# Patient Record
Sex: MALE
[De-identification: names, ages, dates, MRNs, and addresses within clinical notes are randomized per-mention and may not be internally consistent; named-entity substitution may affect disease eponyms.]

---

## 2023-03-10 ENCOUNTER — HOSPITAL ENCOUNTER (INPATIENT)
Dept: HOSPITAL 95 - ICUE | Age: 88
LOS: 4 days | DRG: 871 | End: 2023-03-14
Attending: STUDENT IN AN ORGANIZED HEALTH CARE EDUCATION/TRAINING PROGRAM | Admitting: STUDENT IN AN ORGANIZED HEALTH CARE EDUCATION/TRAINING PROGRAM
Payer: MEDICARE

## 2023-03-10 VITALS — HEIGHT: 70 IN | WEIGHT: 205.25 LBS | BODY MASS INDEX: 29.38 KG/M2

## 2023-03-10 DIAGNOSIS — E87.20: ICD-10-CM

## 2023-03-10 DIAGNOSIS — Z79.82: ICD-10-CM

## 2023-03-10 DIAGNOSIS — M54.9: ICD-10-CM

## 2023-03-10 DIAGNOSIS — N17.0: ICD-10-CM

## 2023-03-10 DIAGNOSIS — Z66: ICD-10-CM

## 2023-03-10 DIAGNOSIS — E86.1: ICD-10-CM

## 2023-03-10 DIAGNOSIS — I44.2: ICD-10-CM

## 2023-03-10 DIAGNOSIS — B95.2: ICD-10-CM

## 2023-03-10 DIAGNOSIS — E87.1: ICD-10-CM

## 2023-03-10 DIAGNOSIS — B96.20: ICD-10-CM

## 2023-03-10 DIAGNOSIS — Z79.899: ICD-10-CM

## 2023-03-10 DIAGNOSIS — N39.0: ICD-10-CM

## 2023-03-10 DIAGNOSIS — E11.22: ICD-10-CM

## 2023-03-10 DIAGNOSIS — E03.9: ICD-10-CM

## 2023-03-10 DIAGNOSIS — I35.0: ICD-10-CM

## 2023-03-10 DIAGNOSIS — Z79.4: ICD-10-CM

## 2023-03-10 DIAGNOSIS — N18.30: ICD-10-CM

## 2023-03-10 DIAGNOSIS — Z88.1: ICD-10-CM

## 2023-03-10 DIAGNOSIS — Z51.5: ICD-10-CM

## 2023-03-10 DIAGNOSIS — R65.21: ICD-10-CM

## 2023-03-10 DIAGNOSIS — Z79.84: ICD-10-CM

## 2023-03-10 DIAGNOSIS — E88.09: ICD-10-CM

## 2023-03-10 DIAGNOSIS — A04.72: ICD-10-CM

## 2023-03-10 DIAGNOSIS — Z88.8: ICD-10-CM

## 2023-03-10 DIAGNOSIS — D63.1: ICD-10-CM

## 2023-03-10 DIAGNOSIS — I44.0: ICD-10-CM

## 2023-03-10 DIAGNOSIS — I48.92: ICD-10-CM

## 2023-03-10 DIAGNOSIS — R31.9: ICD-10-CM

## 2023-03-10 DIAGNOSIS — E78.5: ICD-10-CM

## 2023-03-10 DIAGNOSIS — G70.00: ICD-10-CM

## 2023-03-10 DIAGNOSIS — Z79.891: ICD-10-CM

## 2023-03-10 DIAGNOSIS — I48.0: ICD-10-CM

## 2023-03-10 DIAGNOSIS — A41.50: Primary | ICD-10-CM

## 2023-03-10 DIAGNOSIS — E86.9: ICD-10-CM

## 2023-03-10 DIAGNOSIS — Z79.2: ICD-10-CM

## 2023-03-10 DIAGNOSIS — K21.9: ICD-10-CM

## 2023-03-10 DIAGNOSIS — G93.41: ICD-10-CM

## 2023-03-10 DIAGNOSIS — I12.9: ICD-10-CM

## 2023-03-10 DIAGNOSIS — K81.0: ICD-10-CM

## 2023-03-10 DIAGNOSIS — Z79.811: ICD-10-CM

## 2023-03-10 DIAGNOSIS — N40.0: ICD-10-CM

## 2023-03-10 DIAGNOSIS — K59.00: ICD-10-CM

## 2023-03-10 PROCEDURE — C9113 INJ PANTOPRAZOLE SODIUM, VIA: HCPCS

## 2023-03-10 PROCEDURE — A9270 NON-COVERED ITEM OR SERVICE: HCPCS

## 2023-03-10 PROCEDURE — G0378 HOSPITAL OBSERVATION PER HR: HCPCS

## 2023-03-10 PROCEDURE — C1751 CATH, INF, PER/CENT/MIDLINE: HCPCS

## 2023-03-11 LAB
ALBUMIN SERPL BCP-MCNC: 2.1 G/DL (ref 3.4–5)
ALBUMIN SERPL BCP-MCNC: 3.2 G/DL (ref 3.4–5)
ALBUMIN SERPL BCP-MCNC: 3.4 G/DL (ref 3.4–5)
ALBUMIN/GLOB SERPL: 1 {RATIO} (ref 0.8–1.8)
ALBUMIN/GLOB SERPL: 1.1 {RATIO} (ref 0.8–1.8)
ALBUMIN/GLOB SERPL: 1.2 {RATIO} (ref 0.8–1.8)
ALT SERPL W P-5'-P-CCNC: 11 U/L (ref 12–78)
ALT SERPL W P-5'-P-CCNC: 14 U/L (ref 12–78)
ALT SERPL W P-5'-P-CCNC: 46 U/L (ref 12–78)
ANION GAP SERPL CALCULATED.4IONS-SCNC: 3 MMOL/L (ref 6–16)
ANION GAP SERPL CALCULATED.4IONS-SCNC: 4 MMOL/L (ref 6–16)
ANION GAP SERPL CALCULATED.4IONS-SCNC: 6 MMOL/L (ref 6–16)
ANION GAP SERPL CALCULATED.4IONS-SCNC: 8 MMOL/L (ref 6–16)
AST SERPL W P-5'-P-CCNC: 13 U/L (ref 12–37)
AST SERPL W P-5'-P-CCNC: 14 U/L (ref 12–37)
AST SERPL W P-5'-P-CCNC: 72 U/L (ref 12–37)
BASOPHILS # BLD AUTO: 0.01 K/MM3 (ref 0–0.23)
BASOPHILS # BLD AUTO: 0.01 K/MM3 (ref 0–0.23)
BASOPHILS # BLD: 0 K/MM3 (ref 0–0.23)
BASOPHILS NFR BLD AUTO: 0 % (ref 0–2)
BASOPHILS NFR BLD AUTO: 0 % (ref 0–2)
BASOPHILS NFR BLD: 0 % (ref 0–2)
BILIRUB SERPL-MCNC: 0.7 MG/DL (ref 0.1–1)
BILIRUB SERPL-MCNC: 0.7 MG/DL (ref 0.1–1)
BILIRUB SERPL-MCNC: 1.4 MG/DL (ref 0.1–1)
BUN SERPL-MCNC: 25 MG/DL (ref 8–24)
BUN SERPL-MCNC: 28 MG/DL (ref 8–24)
BUN SERPL-MCNC: 28 MG/DL (ref 8–24)
BUN SERPL-MCNC: 30 MG/DL (ref 8–24)
CALCIUM SERPL-MCNC: 7.3 MG/DL (ref 8.5–10.1)
CALCIUM SERPL-MCNC: 8.1 MG/DL (ref 8.5–10.1)
CALCIUM SERPL-MCNC: 8.7 MG/DL (ref 8.5–10.1)
CALCIUM SERPL-MCNC: 8.7 MG/DL (ref 8.5–10.1)
CHLORIDE SERPL-SCNC: 103 MMOL/L (ref 98–108)
CHLORIDE SERPL-SCNC: 106 MMOL/L (ref 98–108)
CHLORIDE SERPL-SCNC: 109 MMOL/L (ref 98–108)
CHLORIDE SERPL-SCNC: 99 MMOL/L (ref 98–108)
CO2 SERPL-SCNC: 19 MMOL/L (ref 21–32)
CO2 SERPL-SCNC: 24 MMOL/L (ref 21–32)
CO2 SERPL-SCNC: 29 MMOL/L (ref 21–32)
CO2 SERPL-SCNC: 29 MMOL/L (ref 21–32)
CREAT SERPL-MCNC: 1.47 MG/DL (ref 0.6–1.2)
CREAT SERPL-MCNC: 1.49 MG/DL (ref 0.6–1.2)
CREAT SERPL-MCNC: 1.52 MG/DL (ref 0.6–1.2)
CREAT SERPL-MCNC: 2.05 MG/DL (ref 0.6–1.2)
DEPRECATED RDW RBC AUTO: 41.1 FL (ref 35.1–46.3)
DEPRECATED RDW RBC AUTO: 41.2 FL (ref 35.1–46.3)
DEPRECATED RDW RBC AUTO: 44.5 FL (ref 35.1–46.3)
EOSINOPHIL # BLD AUTO: 0.03 K/MM3 (ref 0–0.68)
EOSINOPHIL # BLD AUTO: 0.07 K/MM3 (ref 0–0.68)
EOSINOPHIL # BLD: 0 K/MM3 (ref 0–0.68)
EOSINOPHIL NFR BLD AUTO: 0 % (ref 0–6)
EOSINOPHIL NFR BLD AUTO: 1 % (ref 0–6)
EOSINOPHIL NFR BLD: 0 % (ref 0–6)
ERYTHROCYTE [DISTWIDTH] IN BLOOD BY AUTOMATED COUNT: 11.9 % (ref 11.7–14.2)
ERYTHROCYTE [DISTWIDTH] IN BLOOD BY AUTOMATED COUNT: 11.9 % (ref 11.7–14.2)
ERYTHROCYTE [DISTWIDTH] IN BLOOD BY AUTOMATED COUNT: 12.3 % (ref 11.7–14.2)
GLOBULIN SER CALC-MCNC: 2.2 G/DL (ref 2.2–4)
GLOBULIN SER CALC-MCNC: 2.8 G/DL (ref 2.2–4)
GLOBULIN SER CALC-MCNC: 2.9 G/DL (ref 2.2–4)
GLUCOSE SERPL-MCNC: 110 MG/DL (ref 70–99)
GLUCOSE SERPL-MCNC: 130 MG/DL (ref 70–99)
GLUCOSE SERPL-MCNC: 130 MG/DL (ref 70–99)
GLUCOSE SERPL-MCNC: 180 MG/DL (ref 70–99)
HCT VFR BLD AUTO: 29.9 % (ref 37–53)
HCT VFR BLD AUTO: 30.2 % (ref 37–53)
HCT VFR BLD AUTO: 31.4 % (ref 37–53)
HGB BLD-MCNC: 10.2 G/DL (ref 13.5–17.5)
HGB BLD-MCNC: 10.3 G/DL (ref 13.5–17.5)
HGB BLD-MCNC: 10.3 G/DL (ref 13.5–17.5)
IMM GRANULOCYTES # BLD AUTO: 0.02 K/MM3 (ref 0–0.1)
IMM GRANULOCYTES # BLD AUTO: 0.03 K/MM3 (ref 0–0.1)
IMM GRANULOCYTES NFR BLD AUTO: 0 % (ref 0–1)
IMM GRANULOCYTES NFR BLD AUTO: 0 % (ref 0–1)
LEUKOCYTE ESTERASE UR QL STRIP: (no result)
LYMPHOCYTES # BLD AUTO: 0.95 K/MM3 (ref 0.84–5.2)
LYMPHOCYTES # BLD AUTO: 1.11 K/MM3 (ref 0.84–5.2)
LYMPHOCYTES # BLD: 0.08 K/MM3 (ref 0.84–5.2)
LYMPHOCYTES NFR BLD AUTO: 10 % (ref 21–46)
LYMPHOCYTES NFR BLD AUTO: 12 % (ref 21–46)
LYMPHOCYTES NFR BLD: 1 % (ref 21–46)
MAGNESIUM SERPL-MCNC: 2.1 MG/DL (ref 1.6–2.4)
MAGNESIUM SERPL-MCNC: 2.6 MG/DL (ref 1.6–2.4)
MCHC RBC AUTO-ENTMCNC: 32.8 G/DL (ref 31.5–36.5)
MCHC RBC AUTO-ENTMCNC: 34.1 G/DL (ref 31.5–36.5)
MCHC RBC AUTO-ENTMCNC: 34.1 G/DL (ref 31.5–36.5)
MCV RBC AUTO: 94 FL (ref 80–100)
MCV RBC AUTO: 94 FL (ref 80–100)
MCV RBC AUTO: 98 FL (ref 80–100)
METAMYELOCYTES # BLD MANUAL: 0.56 K/MM3 (ref 0–0)
METAMYELOCYTES NFR BLD MANUAL: 7 % (ref 0–0)
MONOCYTES # BLD AUTO: 0.85 K/MM3 (ref 0.16–1.47)
MONOCYTES # BLD AUTO: 0.86 K/MM3 (ref 0.16–1.47)
MONOCYTES # BLD: 0.08 K/MM3 (ref 0.16–1.47)
MONOCYTES NFR BLD AUTO: 9 % (ref 4–13)
MONOCYTES NFR BLD AUTO: 9 % (ref 4–13)
MONOCYTES NFR BLD: 1 % (ref 4–13)
NEUTROPHILS # BLD AUTO: 7.53 K/MM3 (ref 1.96–9.15)
NEUTROPHILS # BLD AUTO: 7.82 K/MM3 (ref 1.96–9.15)
NEUTROPHILS NFR BLD AUTO: 78 % (ref 41–73)
NEUTROPHILS NFR BLD AUTO: 81 % (ref 41–73)
NEUTS BAND NFR BLD MANUAL: 27 % (ref 0–8)
NEUTS SEG # BLD MANUAL: 7.28 K/MM3 (ref 1.96–9.15)
NEUTS SEG NFR BLD MANUAL: 64 % (ref 41–73)
NRBC # BLD AUTO: 0 K/MM3 (ref 0–0.02)
NRBC BLD AUTO-RTO: 0 /100 WBC (ref 0–0.2)
PH BLDV: 7.26 [PH] (ref 7.34–7.37)
PHOSPHATE SERPL-MCNC: 2.6 MG/DL (ref 2.5–4.9)
PLATELET # BLD AUTO: 123 K/MM3 (ref 150–400)
PLATELET # BLD AUTO: 184 K/MM3 (ref 150–400)
PLATELET # BLD AUTO: 186 K/MM3 (ref 150–400)
POTASSIUM SERPL-SCNC: 3.4 MMOL/L (ref 3.5–5.5)
POTASSIUM SERPL-SCNC: 4.1 MMOL/L (ref 3.5–5.5)
POTASSIUM SERPL-SCNC: 4.5 MMOL/L (ref 3.5–5.5)
POTASSIUM SERPL-SCNC: 4.7 MMOL/L (ref 3.5–5.5)
PROT SERPL-MCNC: 4.3 G/DL (ref 6.4–8.2)
PROT SERPL-MCNC: 6 G/DL (ref 6.4–8.2)
PROT SERPL-MCNC: 6.3 G/DL (ref 6.4–8.2)
PROT UR STRIP-MCNC: (no result) MG/DL
PROTHROMBIN TIME: 10.5 SEC (ref 9.7–11.5)
RBC #/AREA URNS HPF: (no result) /HPF (ref 0–2)
SODIUM SERPL-SCNC: 132 MMOL/L (ref 136–145)
SODIUM SERPL-SCNC: 135 MMOL/L (ref 136–145)
SODIUM SERPL-SCNC: 136 MMOL/L (ref 136–145)
SODIUM SERPL-SCNC: 136 MMOL/L (ref 136–145)
SP GR SPEC: 1.01 (ref 1–1.02)
TOTAL CELLS COUNTED BLD: 100
URN SPEC COLLECT METH UR: (no result)
UROBILINOGEN UR STRIP-MCNC: (no result) MG/DL

## 2023-03-11 PROCEDURE — 02H633Z INSERTION OF INFUSION DEVICE INTO RIGHT ATRIUM, PERCUTANEOUS APPROACH: ICD-10-PCS

## 2023-03-11 PROCEDURE — 4A033R1 MEASUREMENT OF ARTERIAL SATURATION, PERIPHERAL, PERCUTANEOUS APPROACH: ICD-10-PCS | Performed by: STUDENT IN AN ORGANIZED HEALTH CARE EDUCATION/TRAINING PROGRAM

## 2023-03-11 PROCEDURE — 0T9B70Z DRAINAGE OF BLADDER WITH DRAINAGE DEVICE, VIA NATURAL OR ARTIFICIAL OPENING: ICD-10-PCS | Performed by: STUDENT IN AN ORGANIZED HEALTH CARE EDUCATION/TRAINING PROGRAM

## 2023-03-11 PROCEDURE — 3E03329 INTRODUCTION OF OTHER ANTI-INFECTIVE INTO PERIPHERAL VEIN, PERCUTANEOUS APPROACH: ICD-10-PCS | Performed by: STUDENT IN AN ORGANIZED HEALTH CARE EDUCATION/TRAINING PROGRAM

## 2023-03-11 PROCEDURE — 3E033XZ INTRODUCTION OF VASOPRESSOR INTO PERIPHERAL VEIN, PERCUTANEOUS APPROACH: ICD-10-PCS | Performed by: STUDENT IN AN ORGANIZED HEALTH CARE EDUCATION/TRAINING PROGRAM

## 2023-03-11 NOTE — NUR
DR. BREAUX IN TO SEE PT. FULL UPDATE GIVEN. PT MEDS RECONCILED WITH HIS WIFE.
DR. BREAUX TO UPDATE. 12 LEAD ECG DONE. ECG SHOWN AFIB WITH RATE 'S.
 AWARE.

## 2023-03-11 NOTE — NUR
LEVOPHED DRIP TITRATED UP TO 7 MCG/MIN TO KEEP MAP 60-65. LACTIC ACID 6.5-DR. BREAUX UPDATED. PROCALCITONIN STILL PENDING. REPORT GIVEN TO MARISOL AYON. RN TO
CONTACT NOC SHIFT HOSPITALIST IF PROCALCITONIN ELEVATED.

## 2023-03-11 NOTE — NUR
PT CONFUSED AND DISORIENTED. PT MOANING, BUT UNABLE TO REPORT DISCOMFORT AT
THIS TIME. ECG SHOWS SR TO SB WITH 1ST DEGREE AV BLOCK AND PVC'S. MAP TRENDING
47. LUNGS DIMINISHED T/O WITH FEW SCATTERED FINE CRACKLES TO MID/UPPER LOBES.
NO NOTED SOB. SATS 85% ON RA. PT PLACED ON 2 LITERS NASAL CANULA TO KEEP SATS
>90%. DR. BREAUX UPDATED. SATS CHEST XRAY DONE.

## 2023-03-11 NOTE — NUR
MAP TRENDING LESS THAN 50. HR 70-80'S-SR WITH 1ST DEGREE AV BLOCK AND
PAC/PVC'S. PT NORMALLY TAKES LISINOPRIL 2.5 MG PO PER HIS WIFE AND HE WAS
PRESCRIBED [address] MG [address] MIRANDA. DR. BREAUX AWARE. NS 1 LITER BOLUS TO BE
GIVEN. ECHO ORDERED.

## 2023-03-11 NOTE — NUR
PT REMAINS HYPOTENSIVE DESPITE FLUID BOLUSES. PICC LINE PLACEMENT
CONFIRMED-LEVOPHED DRIP INITIATED @ 2 MCG/MIN.

## 2023-03-11 NOTE — NUR
SHIFT SUMMARY
 
PATIENT REMAINS ALERT AND ORIENTED X4. 02 SATS 96% ON RA, DENIES SOB. HR SB
FIRST DEGREE HB, 45-50s, DROPPED QRS COMPLEX AT TIMES AND HR IN THE 30s. DOES
NOT SUSTAIN AND WHEN AWAKE HR IN THE 60s. BP STABLE, DENIES CP/PRESSURE.
PATIENT INDEPENDENT WITH REPOSITIONING. ABLE TO URINATE 450 MLS AFTER ALEXIS
WAS DISCONTINUED. MEDICATED WITH HOME PAIN MEDICATION PER EMAR FOR BACK PAIN.
CALL LIGHT IN REACH

## 2023-03-11 NOTE — NUR
ECHO COMPLETE. PT REPORTS 8/10 LOW BACK PAIN. MED WITH NORCO-SEE EMAR. PT
INCONTINENT OF SMALL, SMEAR OF BROWN STOOL. MARLENE CARE DONE, NEW ATTENDS, NEW
CARDONA, AND TURNED PT TO RIGHT SIDE. PT REPORTS POOR APPETITE AND STATES HE
DOES NOT FEEL LIKE EATING AT THIS TIME. PT DENIES NAUSEA. NS  INFUSING @ 150
CC/HR. PT CONTINUES TO DENY CP OR SOB. ECG SHOWS FREQUENT VENTIRICULAR ECTOPY
AND 4-5 BEAT RUN OF POLYMORPHIC VTACH. PT ASYMPTOMATIC. DR. BREAUX UPDATED AND
LABS ORDERED.,

## 2023-03-11 NOTE — NUR
PT A&OX4. [place] @ BASELINE. PT REPORTS THAT HIS MYSTHENIA GRAVIS LEAVES HIM
FEELING VERY WEAK AT TIMES. PT STATES THAT HE FEELS WEAK THIS AM.  PT HAS
RESIDUAL FOOT DROP FROM THE MYSTHENIA GRAVIS R>L. PT STATES THAT HE IS USUALLY
ABLE TO DRIVE AND DO HIS OWN ADL'S AT HOME. PT DENIES PAIN, CHEST DISCOMFORT,
AND SOB. ECG SHOWS SB TO SR WITH 1ST DEGREE AV BLOCK AND OCCASIONAL BLOCKED
PAC'S. 'S. NO NOTED EDEMA. DP/PT PULSES PALPABLE. LUNGS CLEAR AND
SLIGHTLY DIMINISHED IN THE BASES. SATS>90% ON RA. PT DENIES GI DISTRESS. HE
HAS A HX OF TYPE 2 DIABETES FOR WHICH HE TAKE METFORMIN @ HOME. PT CHECKS HIS
BLOOD SUGAR MONTHLY. ADA DIET TO BE INITIATED PER DR. BREAUX. PT REPORTS MILD
DYSURIA AND HEMATURIA NOTED. HEPARIN HELD DUE TO HEMATURIA. PT BOOSTED IN BED
FOR BREAKFAST. CALL LIGHT WITHIN REACH. PT MAKING HIS NEEDS KNOWN.

## 2023-03-11 NOTE — NUR
ASSUMED CARE AT 1900
 
PATIENT IS LETHARGIC AND ORIENTED TO SELF/FOLLOWING COMMANDS. CONFUSED AND
CALLING OUT FOR HIS WIFE. 02 SATS 97% ON 2L VIA NC. LS CLEAR TO DIMINISHED. HR
SB WITH PVCs FHB 50s, BP HYPOTENSIVE, CALLED DR AND PATIENT NOW ON LEVO, VASO
AND EPI TO MAINTAIN MAP >65. DENIES CP/PRESSURE. ALEXIS WITH BLOOD IN URINE AND
FROM URETHRA. UNSURE IF PATIENT PULLED ON CATHETER. PATIENT TO CT FOR CT OF
ABDOMEN. ANTIBIOTICS STARTED.

## 2023-03-11 NOTE — NUR
DR. BREAUX HERE TO SEE PT. FULL UPDATE GIVEN. ORDERS WRITTEN FOR LABS,BC X 2,
ADDITIONAL IV BOLUS, LEVOPHED, ALEXIS CATHETER PLACEMENT-UA, GI PANEL, PT MADE
NPO.

## 2023-03-11 NOTE — NUR
PATIENT ARRIVED AS A DIRECT ADMIT FROM [place] AT 2325. PATIENT IS ALERT AND
ORIENTED X4. 02 SATS 99% ON RA, DENIES SOB. HR SB-SR 55-65, PATIENT NOT PACED
DURING TRANSPORT. LONG [place] INTERVAL ON MONITOR. PATIENT DENIES CP/PRESSURE. BP
STABLE. ALEXIS THAT WAS PRESENT ON ADMIT WITH RED URINE, DC'd AND NOT REPLACED
AT THIS TIME, WILL MONITOR FOR RESTENTION. PATIENT INDEPENDENT WITH
REPOSITIONING IN BED, USES WALKER AT HOME. DR. MIRANDA TO ROOM AFTER PATIENT
ARRIVED.

## 2023-03-11 NOTE — NUR
PT HAVING FREQUENT INCONTINENCE OF BROWN, SOFT STOOL. HE REPORTS FEELING
EXTREMEMLY WEAK. MAP TRENDING 54-NS BOLUS STILL INFUSING.

## 2023-03-12 LAB
ALBUMIN SERPL BCP-MCNC: 2.5 G/DL (ref 3.4–5)
ALBUMIN/GLOB SERPL: 0.9 {RATIO} (ref 0.8–1.8)
ALT SERPL W P-5'-P-CCNC: 55 U/L (ref 12–78)
ANION GAP SERPL CALCULATED.4IONS-SCNC: 13 MMOL/L (ref 6–16)
AST SERPL W P-5'-P-CCNC: 72 U/L (ref 12–37)
BASOPHILS # BLD: 0 K/MM3 (ref 0–0.23)
BASOPHILS NFR BLD: 0 % (ref 0–2)
BILIRUB SERPL-MCNC: 1.2 MG/DL (ref 0.1–1)
BUN SERPL-MCNC: 33 MG/DL (ref 8–24)
CALCIUM SERPL-MCNC: 7.4 MG/DL (ref 8.5–10.1)
CHLORIDE SERPL-SCNC: 105 MMOL/L (ref 98–108)
CO2 SERPL-SCNC: 13 MMOL/L (ref 21–32)
CREAT SERPL-MCNC: 2.24 MG/DL (ref 0.6–1.2)
DEPRECATED RDW RBC AUTO: 45.9 FL (ref 35.1–46.3)
EOSINOPHIL # BLD: 0 K/MM3 (ref 0–0.68)
EOSINOPHIL NFR BLD: 0 % (ref 0–6)
ERYTHROCYTE [DISTWIDTH] IN BLOOD BY AUTOMATED COUNT: 12.6 % (ref 11.7–14.2)
GLOBULIN SER CALC-MCNC: 2.7 G/DL (ref 2.2–4)
GLUCOSE SERPL-MCNC: 293 MG/DL (ref 70–99)
HCT VFR BLD AUTO: 33.2 % (ref 37–53)
HGB BLD-MCNC: 10.8 G/DL (ref 13.5–17.5)
MAGNESIUM SERPL-MCNC: 1.8 MG/DL (ref 1.6–2.4)
MCHC RBC AUTO-ENTMCNC: 32.5 G/DL (ref 31.5–36.5)
MCV RBC AUTO: 100 FL (ref 80–100)
METAMYELOCYTES # BLD MANUAL: 1.72 K/MM3 (ref 0–0)
METAMYELOCYTES NFR BLD MANUAL: 7 % (ref 0–0)
MONOCYTES # BLD: 0.24 K/MM3 (ref 0.16–1.47)
MONOCYTES NFR BLD: 1 % (ref 4–13)
MYELOCYTES # BLD MANUAL: 0.49 K/MM3 (ref 0–0)
MYELOCYTES NFR BLD MANUAL: 2 % (ref 0–0)
NEUTS BAND NFR BLD MANUAL: 18 % (ref 0–8)
NEUTS SEG # BLD MANUAL: 22.23 K/MM3 (ref 1.96–9.15)
NEUTS SEG NFR BLD MANUAL: 72 % (ref 41–73)
NRBC # BLD AUTO: 0 K/MM3 (ref 0–0.02)
NRBC BLD AUTO-RTO: 0 /100 WBC (ref 0–0.2)
PLATELET # BLD AUTO: 139 K/MM3 (ref 150–400)
POTASSIUM SERPL-SCNC: 4.3 MMOL/L (ref 3.5–5.5)
PROT SERPL-MCNC: 5.2 G/DL (ref 6.4–8.2)
SODIUM SERPL-SCNC: 131 MMOL/L (ref 136–145)
TOTAL CELLS COUNTED BLD: 100
VANCOMYCIN SERPL-MCNC: 7.7 UG/ML

## 2023-03-12 PROCEDURE — 02HV33Z INSERTION OF INFUSION DEVICE INTO SUPERIOR VENA CAVA, PERCUTANEOUS APPROACH: ICD-10-PCS | Performed by: RADIOLOGY

## 2023-03-12 NOTE — NUR
PT VERY CONFUSED AND AGITATED-PULLING ON LINES AND TUBES AND TRYING TO GET
OOB. PT CALLING FOR "KAYLYN!" PT MOANING AND CRYING OUT "KOBE!" UNABLE TO
RE-ORIENT OR RE-DIRECT PT. DR. CRUZ IN TO SEE PT. DOBUTAMINE DRIP
DISCONTINUED.

## 2023-03-12 NOTE — NUR
PT VERY CONFUSED THIS AM. PT ORIENTED TO SELF ONLY. PT APPEARS VERY RESTLESS
AND AGITATED. PT IS MOANING, BUT UNABLE TO COMMUNICATE THE SOURCE OF HIS
DISCOMFORT. PT PICKING AT BLANKETS AND PULLING ON IV LINES AND TUBES. ECG
SHOWS AFIB WITH RATE 70'S-OCCASIONAL VENTRICULAR ECTOPY NOTED. MAP TRENDING
50'S ON LEVOPHED @ 30 MCG/KG/MIN, VASOPRESSIN @ 0.04 UNITS/MIN, AND
EPINEPHRINE @ 10 MCG/MIN. TEMP 99.3. LUNGS DIMINISHED IN THE BASES.
RESPIRATIONS SHALLOW WITH RATE 22-36. SATS>90% ON 2 LITERS NASAL CANULA. PT
NPO. ABDOMEN IS SLIGHTLY FIRM AND TENDER TO PALPATION. BT'S HYPOACTIVE. ALEXIS
WITH SMALL AMOUNT OF BLOOD AROUND MEATUS. URINE OUTPUT POOR AND THE SCANT
AMOUNT IN THE UROMETER IS BROWN. SKIN APPEARS QUITE KELLEY THIS AM. DR. BREAUX
GIVEN FULL UPDATE. DR. CRUZ AND DR. NICHOLS CONSULTED. ORDERS PLACED FOR
ADDITIONAL LABS AND DOBUTAMINE DRIP.

## 2023-03-12 NOTE — NUR
SHIFT SUMMARY
 
PATIENT IS ALERT BUT CONFUSED, ORIENTED TO SELF AND FOLLOWING COMMANDS. 02
SATS 95% ON RA, DENIES SOB. HR 74, FHB. BP HYPOTENSIVE, LEVO, VASO AND EPI
REMAIN INF. PATIENT REFUSED TO LET DR PUT IN ART LINE, DUE TO CONFUSION WIFE
WAS CALLED, WIFE STATED TO NOT DO THE ART LINE AT THIS TIME BUT WISHES TO KEEP
PATIENT A FULL CODE. WIFE UPDATE ON PATIENT CONDITION. PATIENT REMAINS
AGITATED AND DIFFICULT TO REDIRECT. ALEXIS DRAINING DARK BROWN, MINIMAL AMOUNT
OF URINE. CIRTICAL VALUES CALLED TO  MEDICATED FOR BACK PAIN PER EMAR.

## 2023-03-12 NOTE — NUR
DR. NICHOLS CONTACTED AND GIVEN FULL UPDATE. ORDERS PLACED FOR AM LABS, RENAL
ULTRASOUND, AND BICARB DRIP.

## 2023-03-12 NOTE — NUR
PT REMAINS CONFUSED AND AGITATED. PT VEBALIZING IMPENDING DOOM. PT NOW
ORTHOPNEIC AND TACHYPNEIC. LUNGS DIMINISHED R>L WITH SCATTERED CRACKLES
AUSCULTATED. DR. CRUZ AT BEDSIDE. BICARB DRIP DISCONTINUED AND EPI DRIP
TITRATED DOWN TO 8 MCG/MIN. PRECEDEX @ 0.6 MG/KG/MIN-YET PT REMAINS VERY
RESTLESS.

## 2023-03-12 NOTE — NUR
PT APPEARS TO BE SLEEPING, YET LEFT EYE OPEN. ATTEMPTS TO CLOSE THE LEFT EYE
HAVE FAILED. PUPILS 2 MM AND SLUGGISH. PRECEDEX DRIP @ 0.7 MCG/KG/MIN. TEMP
99.8. ECG CONTINUES AFIB WITH RATE IN THE 70'S. MAP TRENDING 55-60 ON LEVOPHED
@ 30 MCG/MIN, VASOPRESSIN @ 0.04 UNITS/MIN, AND EPINEPHRINE @ 15 MCG/MIN.
LUNGS DIMINISHED R>L WITH SCATTERED CRACKLES TO UPPER LOBES. RESP 22-26 AND
SHALLOW. PT IS MOUTH BREATHING AND ORAL MUCOSA APPEARS VERY DRY DESPITE ORAL
CARE AND IV FLUID BOLUSES. SATS >90% ON 2 LITERS NASAL CANULA. PT REMAINS NPO.
NO BM'S TODAY. ALEXIS TO BSD WITH SMALL AMOUNT OF BROWN URINE TO UROMETER.
MEPILEX INTACT TO COCCYX AS PREVENTATIVE MEASURE. PT BOOSTED IN BED-HIPS AND
HEELS FLOATED.

## 2023-03-12 NOTE — NUR
PT RESTING QUIETLY ON PRECEDEX @ 0.7 MCG/KG/MIN. SOFT WRIST RESTRAINTS PLACED
@ 1100 AS PT REMAINED RESTLESS, AGITATED, PULLING OFF O2, AND PULLING ON
IV LINES AND TUBES. PT WAS MEDICATED WITH FENTANYL 25 MCG IVP FOR PAIN-SEE
EMAR. HIDA SCAN CANCELLED AS PT RECEIVED NARCOTIC MEDICATION. DR. CRUZ AWARE
AND WILL ORDER ULTRASOUND OF GALLBLADDER IF NEEDED. PT IS AFEBRILE. ECG SHOWS
AFIB WITH RATE IN THE 70'S. MAP TRENDING 60-65 ON LEVOPHED @ 30 MCG/MIN,
VASOPRESSIN @ 0.04 UNITS/MIN, AND EPINEPHRINE @ 8 MCG/MIN. LUNGS AUDIBLY
WHEEZY AT TIMES RR 28 AND SHALLOW. SATS DROPPED TO 85 % AND PT WOB INCREASED
SIGNIFICANTLY WHEN HE REMOVED THE O2.  SATS>90% ON 2 LITERS NASAL CANULA. PT
REMAINS NPO. URINE OUTPUT REMAINS POOR-ALEXIS WITH SCANT AMOUNT OF BROWN URINE
TO UROMETER. DR. CRUZ SPOKE WITH PT SON-IN-LAW DR. STEPHEN AND UPDATED HIM TO
PLAN OF CARE. DR. STEPHEN IS ASSISTING MRS. PADILLA WITH DECISION MAKING.
CURRENTLY, PT IS FULL CODE. PALLIATIVE CARE CONSULT PLACED. PT WIFE SAVAGE
IS EN ROUTE FROM [place] AND IS EXPECTED LATER THIS AFTERNOON.

## 2023-03-12 NOTE — NUR
DR. CRUZ IN TO SEE PT. ADDITIONAL FLUID BOLUS TO BE GIVEN. DR. DIAMOND AND DR. WALTERS HAVE BEEN CONSULTED.

## 2023-03-12 NOTE — NUR
MAP TRENDING LESS THAN 60 ON LEVOPHED @ 30 MCG/MIN, VASOPRESSIN @ 0.04
UNITS/MIN, AND EPINEPHRINE @ 15 MCG/MIN. HR 70'S AFIB.  CC BOLUS #2
INITIATED. PT COCCYX AND GLUTEAL FOLD SLIGHTLY PINK-MEPILEX DRESSING PLACED AS
PREVENTATIVE MEASURE. PT REPOSITINED TO LEFT SIDE.

## 2023-03-12 NOTE — NUR
CT HEAD COMPLETE. PT BECAME VERY RESTLESS ON CT TABLE AND WAS NOT ABLE TO
FOLLOW COMMANDS TO HOLD STILL.

## 2023-03-12 NOTE — NUR
MAP TRENDING 50'S ON LEVOPHED @ 30 MCG/MIN, VASOPRESSIN @ 0.04 UNITS/MIN, AND
EPINEPHRINE @ 10 MCG/MIN. EPINEPHRINE TITRATED UP TO 15 MCG/MIN. DR. CRUZ
UPDATE- CC BOLUS INITIATED.

## 2023-03-12 NOTE — NUR
PATIENT SLEEPING AWAKENS TO NOXIOUS STIMULI. MOANING BUT NONVERBAL. MCNEIL
REACHING UP TO CHEST WHEN RESTRAINTS OFF. PRECEDEX TITRATED DOWN TO 0.6 MCG.
VASOPRESSIN 0.04, LEVOPHED 30 MCG, AND EPI 18 MCG INFUSING FOR HYPOTENSION.
DOCTOR ANTHONY IN TO SEE PATIENT. CHANGED HEPARIN TO IV BUT NOT TO START UNTIL
WE HAVE THE RESULTS OF HIDA SCAN, WHICH SHOULD BE DONE IN AM. PATIENT RESP
30'S ON 3L/NC. PATIENT KEEPING LEFT EYE OPEN EVEN WHEN SLEEPING, ORDER
OBTAINED FOR LACRI-LUBE FOR HIS EYES.

## 2023-03-13 LAB
ALBUMIN SERPL BCP-MCNC: 2.3 G/DL (ref 3.4–5)
ALBUMIN/GLOB SERPL: 0.8 {RATIO} (ref 0.8–1.8)
ALT SERPL W P-5'-P-CCNC: 59 U/L (ref 12–78)
ANION GAP SERPL CALCULATED.4IONS-SCNC: 11 MMOL/L (ref 6–16)
AST SERPL W P-5'-P-CCNC: 132 U/L (ref 12–37)
BASOPHILS # BLD: 0 K/MM3 (ref 0–0.23)
BASOPHILS NFR BLD: 0 % (ref 0–2)
BILIRUB DIRECT SERPL-MCNC: 0.8 MG/DL (ref 0–0.3)
BILIRUB INDIRECT SERPL-MCNC: 0.4 MG/DL (ref 0.1–0.7)
BILIRUB SERPL-MCNC: 1.2 MG/DL (ref 0.1–1)
BUN SERPL-MCNC: 54 MG/DL (ref 8–24)
CALCIUM SERPL-MCNC: 6.8 MG/DL (ref 8.5–10.1)
CHLORIDE SERPL-SCNC: 98 MMOL/L (ref 98–108)
CK SERPL-CCNC: 2499 U/L (ref 39–308)
CO2 SERPL-SCNC: 16 MMOL/L (ref 21–32)
CREAT SERPL-MCNC: 2.94 MG/DL (ref 0.6–1.2)
DEPRECATED RDW RBC AUTO: 42.9 FL (ref 35.1–46.3)
EOSINOPHIL # BLD: 0 K/MM3 (ref 0–0.68)
EOSINOPHIL NFR BLD: 0 % (ref 0–6)
ERYTHROCYTE [DISTWIDTH] IN BLOOD BY AUTOMATED COUNT: 12.3 % (ref 11.7–14.2)
GLOBULIN SER CALC-MCNC: 2.8 G/DL (ref 2.2–4)
GLUCOSE SERPL-MCNC: 244 MG/DL (ref 70–99)
HCT VFR BLD AUTO: 32.2 % (ref 37–53)
HGB BLD-MCNC: 10.9 G/DL (ref 13.5–17.5)
MAGNESIUM SERPL-MCNC: 1.8 MG/DL (ref 1.6–2.4)
MCHC RBC AUTO-ENTMCNC: 33.9 G/DL (ref 31.5–36.5)
MCV RBC AUTO: 94 FL (ref 80–100)
METAMYELOCYTES # BLD MANUAL: 3.74 K/MM3 (ref 0–0)
METAMYELOCYTES NFR BLD MANUAL: 7 % (ref 0–0)
MONOCYTES # BLD: 2.67 K/MM3 (ref 0.16–1.47)
MONOCYTES NFR BLD: 5 % (ref 4–13)
NEUTS BAND NFR BLD MANUAL: 30 % (ref 0–8)
NEUTS SEG # BLD MANUAL: 47.02 K/MM3 (ref 1.96–9.15)
NEUTS SEG NFR BLD MANUAL: 58 % (ref 41–73)
NRBC # BLD AUTO: 0 K/MM3 (ref 0–0.02)
NRBC BLD AUTO-RTO: 0 /100 WBC (ref 0–0.2)
PH BLDA: 7.19 [PH] (ref 7.35–7.45)
PHOSPHATE SERPL-MCNC: 5.4 MG/DL (ref 2.5–4.9)
PLATELET # BLD AUTO: 94 K/MM3 (ref 150–400)
POTASSIUM SERPL-SCNC: 4.8 MMOL/L (ref 3.5–5.5)
PROT SERPL-MCNC: 5.1 G/DL (ref 6.4–8.2)
PROTHROMBIN TIME: 15.6 SEC (ref 9.7–11.5)
SODIUM SERPL-SCNC: 125 MMOL/L (ref 136–145)
TOTAL CELLS COUNTED BLD: 100
VANCOMYCIN SERPL-MCNC: 19.2 UG/ML

## 2023-03-13 PROCEDURE — 5A09357 ASSISTANCE WITH RESPIRATORY VENTILATION, LESS THAN 24 CONSECUTIVE HOURS, CONTINUOUS POSITIVE AIRWAY PRESSURE: ICD-10-PCS | Performed by: STUDENT IN AN ORGANIZED HEALTH CARE EDUCATION/TRAINING PROGRAM

## 2023-03-13 PROCEDURE — 0DH67UZ INSERTION OF FEEDING DEVICE INTO STOMACH, VIA NATURAL OR ARTIFICIAL OPENING: ICD-10-PCS | Performed by: RADIOLOGY

## 2023-03-13 NOTE — NUR
DR. HARVEY UPDATED ON PATIENT STATUS.  INFORMED THAT RR STILL IN THE 30S.
INFORMED THAT NC HAD TO BE INCREASED UP TO 6 L BUT WAS ABLE TO TITRATE BACK
DOWN TO 3 L NC TO KEEP SATS 90% AND GREATER.  INFORMED THAT URINE OUTPUT ONLY
70 MLS THIS SHIFT.  INFORMED THAT URINE + FOR E.COLI AND ENTEROCOCCUS
FAECALIS.  INFORMED THAT RECEIVED REPORT THAT PATIENT HAS CHRONIC BACK PAIN AT
[facility] AND TAKES PAIN MEDICATION AT HOME.  ORDER FOR PRN FENTANYL RECEIVED.
NO OTHER ORDERS RECEIVED AT THIS TIME.

## 2023-03-13 NOTE — NUR
INITIAL ASSESSMENT
  PATIENT RESPONDING TO VERBAL STIMULI AT TIMES BUT NOT APPROPRIATELY.
PATIENT ASKED IF HE COULD SQUEEZE NURSE HAND AND PATIENT STATED "YES" BUT THEN
DID NOT SQUEEZE.  PATIENT STARING STRAIGHT AHEAD AND NOT TRACKING NURSE.
PATIENT PUPILS 1+ IN SIZE AND NOT REACTIVE TO LIGHT.  L EYE CLOUDY AND MORE
WIDE OPEN THAN R EYE.  LACRILUBE BEING PLACED INTO BILAT EYES.  PATIENT ONLY
MOVES ARMS AND ONLY SLIGHTLY; DOES NOT APPEAR TO BE MOVING PURPOSELY.  PATIENT
AFEBRILE.  NO SIGNS OF PAIN NOTED.  PATIENT ON 3 L NC TO KEEP SATS 90% AND
GREATER.  LUNGS CLEAR IN UPPER LOBES AND DIM WITH CRACKLES IN LOWER LOBES.
PATIENT GRUNTS WITH EACH BREATH THAT HE TAKES.  PATIENT TACHYPNEIC WITH RR IN
THE 30S.  PATIENT IN AV DISSOCIATION PER DR. WALTERS.  HR IN THE 60S.  MAPS
GREATER THAN 65 ON VASOPRESSIN AT 0.04 UNITS/ MINUTE, LEVOPHED AT 30 MCG/
MINUTE AND EPINEPHRINE AT 20 MCG/ MINUTE.  DIASTOLIC BP DROPS SIGNIFICANTLY
WHEN LEVOPHED TITRATED EVEN 1 TO 2 MCG DOWN.  1+ EDEMA NOTED TO BLES.  SCDS IN
PLACE.  ABDOMEN MODERATELY DISTENDED, SOFT, WITH HYPOACTIVE BOWEL SOUNDS
NOTED.  PATIENT OLIGURIC.  ALEXIS DRAINING MINIMAL AMOUNT OF BOAZ COLORED
URINE WITH SEDIMENT NOTED.  SKIN PALE, COOL.  SCATTERED BRUISES NOTED.  COCCYX
REDDENED.  NS TKO.  BED LOW, CALL LIGHT IN REACH.  WILL CONTINUE TO MONITOR
PATIENT FREQUENTLY THROUGHOUT SHIFT.

## 2023-03-13 NOTE — NUR
PATIENT RESTING IN BED CONTINUES TO GRUNT WITH RESP. FOLLOWING SIMPLE
DIRECTIONS AT TIMES, GENERALIZED WEAKNESS. NO MOVEMENT SEEN IN LEGS, SLIGHT
MOVEMENT TO BOTH ARMS. PRECEDEX REMAINS OFF. PATIENT COOL AND DIAPHORETIC. EPI
20 MCG, LEVOPHED 30 MCG, VASOPRESSIN 0.04 MCG CONTINUE FOR HYPOTENSION. BICARB
DRIP 75 CC/HR. DOBBHOFF IN PLACE AND CLAMPED FOR MEDICATIONS. CONTINUES TO
KEEP LEFT EYE OPEN, ABLE TO CLOSE EYE WHEN WASHING FACE. ORAL CARE WITH
SUCTION TOOTHBRUSH

## 2023-03-13 NOTE — NUR
DR. BREAUX UPDATED ON PATIENT STATUS.  NO ORDERS RECEIVED AT THIS TIME.
INFORMED THAT PALLLIATIVE CARE NURSE CALLED THIS AM AND UPDATED ON PATIENT
STATUS AND THAT SHE WILL BE CALLING FAMILY.

## 2023-03-13 NOTE — NUR
Phone call to pt's family.  Call Tanmay Tello, pt's son and gave him an update.  He
reports that he received an update on pt's condition earlier this am. Tanmay is
currently in [facility] and is planning a trip back to [place] tomorrow as
[place].  Tanmay
states he is aware of the severity of illness and states that he knows that
his father could pass away at anytime from his current illness.  Tanmay is
supportive of his mom's decision and requested that this writer call
Aruna, his mom. Spoke with Aruna on the phone and updated her as well.
Aruna is currently in [place] at home. She states she would not want
Lane to suffer and states that she knows he would not want to live on
machines and have a poor quality of life. Aruna changed Lane's code
status to DNR with continued medical treatments at this time.  Aruna asked
if she would talk to Lane.  Encouraged her to call ICU and have the nurses
hold a phone up to Lane's ear.  Informed both Tanmay and Aruna that staff
will update them with any changes.  Encouraged them to call ICU for additional
updates as they would like.  They are planning to return to [facility] tomorrow.
Spoke with Dr. Romero and Mary Carmen, pt's bedside nurse.  PC to continue to assist
with advanced care planning prn.

## 2023-03-13 NOTE — NUR
DR. HARVEY UPDATED ON PATIENT STATUS.  INFORMED THAT PATIENT STARING FORWARD AND
THAT HE WILL OCCASIONALLY ANSWER YES AND NO QUESTIONS BUT NOT CORRECTLY.
INFORMED THAT PATIENT NOT FOLLOWING COMMANDS AT THIS TIME.  INFORMED THAT
LEVOPHED DECREASED FROM 30 TO 29 MCG/ MINUTE AND THAT DIASTOLIC BP DROPPED TO
30S.  INFORMED THAT EPINEPHRINE AT 20 MCG/ MINUTE AND VASOPRESSIN AT 0.04
UNITS/ MINUTE.  INFORMED THAT DR. WALTERS IN THIS AM AND STATED THAT PATIENT
IS IN AV DISSOCIATION AND NOT A 3RD DEGREE HB AND THAT THEY WILL NOT BE DOING
TEMP PACER FOR PATIENT UNLESS HE GOES AND STAYS BRADYCARDIC.  INFORMED THAT
PATIENT POSITIVE FOR C.DIFF THIS AM.  INFORMED THAT PATIENT PLACED ON SODIUM
BICARB PER DR. NICHOLS.  INFORMED THAT PATIENT +6 L FLUID OVERLOADED AND 10 KG
HEAVIER THAN AT ADMIT.  INFORMED THAT PATIENT HAD ONLY 175 OF URINE OUTPUT ON
NIGHT SHIFT.  INFORMED THAT PLATELETS 94, WBCS 53.44, SODIUM 125 AND THAT
KIDNEY LABS ARE WORSENING.  INFORMED THAT PATIENT MAKING GRUNTING NOISE WHILE
BREATHING.  INFORMED THAT HEPARIN DRIP WAITING TO BE STARTED UNTIL HIPA
RESULTS BACK.

## 2023-03-13 NOTE — NUR
BIOX [id number] PLACED ON NON REBREATHER MASK WITH NO IMPROVEMENT. FINGERS DUSKY.
SKIN CONTINUES TO BE COLD AND DIAPHORETIC.  PT LESS RESPONSIVE, RESP CONTINUE
30'S YET APPEAR MORE SHALLOW. NO RHONCHI OR CRACKLES HEARD. DEEP ORAL SUCTION
WITH NO RESULTS, AND PATIENT HAD NO REACTION TO BEING SUCTIONED. DOCTOR WES
NOTIFIED, PATIENT PLACED ON BIPAP 16/12 FIO2 40%

## 2023-03-13 NOTE — NUR
SHIFT SUMMARY
  NO CHANGES TO NEURO STATUS THIS SHIFT.  PATIENT CONTINUES TO STARE AHEAD AND
NOT TRACK NURSE.  PATIENT CONTINUES TO GRUNT WITH BREATHING.  PATIENT REMAINS
NOT FOLLOWING ANY COMMANDS.  PATIENT REMAINED AFEBRILE.  PATIENT GIVEN PRN
FENTANYL AS TAKES PAIN MEDICATION AT HOME FOR CHRONIC BACK PAIN; ARMS SEEMED
LESS RESTLESS AFTER FENTANYL GIVEN.  RR REMAINED IN 30S.  PATIENT REMAINED
MOSTLY ON 3 L NC TO KEEP SATS 90% AND GREATER.  PATIENT REMAINEDIN AV
DISSOCIATION.  VENTRICULAR RATE REMAINED IN 60S.  MAPS MOSTLY STAYED ABOVE 65
ON LEVOPHED AT 30 MCG/ MINUTE, VASOPRESSIN AT 0.04 UNITS/ MINUTE AND EPI AT 20
MCG/ MINUTE.  DIASTOLIC BP REMAINED DROPPING A GOOD AMOUNT WITH EVEN 1 MCG
TITRATE DOWN ON LEVO.  PATIENT HAD ONE BM THIS SHIFT.  DOBHOFF INSERTED FOR
MEDS.  201 MLS OF URINE OUT.  NO CHANGES TO SKIN NOTED.  PATIENT HAD COMPLETE
CHG BATH.  SODIUM BICARB AT 75 MLS/ HOUR STARTED THIS AM PER DR. NICHOLS.  ORAL
VANCO AND IV FLAGYL STARTED THIS SHIFT AS PATIENT CAME BACK + FOR C. DIFF THIS
AM.  BLOOD SUGARS 130S TO 160S.  FAMILY DID NOT COME IN TODAY BECAUSE OF
WEATHER AT HOME BUT ARE PLANNING ON COMING IN TOMORROW.  WIFE CALLED TWICE
TODAY FOR UPDATE.  BED LOW, CALL LIGHT IN REACH.  REPORT WILL BE GIVEN TO
ASSUMING NIGHT SHIFT NURSE SHORTLY.

## 2023-03-13 NOTE — NUR
PATIENT MORE AWAKE, ANSWERING YES AND NO TO SIMPLE QUESTIONS. PRECEDEX 0.6
CONTINUES. PATIENT AGREEING TO NOT PULL ON LINES AND CORDS, BILAT WRIST
RESTRAINTS OFF. PATIENT GENERALIZED WEAKNESS, AT THIS TIME LEAVING LINES AND
CORDS IN PLACE. LEVOPHED, EPI AND VASOPRESSIN CONTINUE BP CONTINUES TO BE
LABILE.

## 2023-03-13 NOTE — NUR
SUMMARY
PATIENT CONTINUES TO BE CONFUSED ANSWERING SIMPLE QUESTIONS WITH YES AND NO
SLIGHT MOVEMENT TO LEGS, MOVING ARMS WITH PURPOSE, BUT VERY WEAK. PRECEDEX 0.2
MCG FOR AGITATION. GRUNTING WITH EACH BREATH. RESP CONTINUE TO BE 30'S T/O
NIGHT. BIOX DIFFICULT TO OBTAIN AT TIMES DUE TO POOR PERFUSION.  90-97% ON
3L/NC. CONTINUES ON EPI 20 MCG, LEVOPHED 30 MCG, AND VASOPRESSIN 0.04
MCG FOR HYPOTENSION. VERY POOR URINE OUTPUT, ALEXIS DRAINING ORANGE/BOAZ
URINE. FREQUENT ORAL CARE T/O NIGHT PATIENT MORE COOPERATIVE AS NIGHT
PROGRESSED. BILAT WRIST RESTRAINTS REMOVED AT 2400. PATIENT INCONT OF MEDIUM
STRONG SMELLING BROWN LOOSE BM.

## 2023-03-13 NOTE — NUR
PATIENT AFEBRILE.  NO SIGNS OF PAIN NOTED AT THIS TIME.  PATIENT OFF OF
PRECEDEX.  NO CHANGES TO NEURO STATUS NOTED.  HR IN THE 60S.  PATIENT REMAINS
ON LEVOPHED, EPI, AND VASOPRESSIN TO KEEP MAPS 65 AND GREATER.  MINIMAL URINE
OUTPUT; DR. HARVEY AWARE.  PATIENT ON 6 L NC FOR SHORT TIME BUT BACK TO 3 L NC
TO KEEP SATS 90% AND GREATER.  DOBHOFF INSERTED FOR MEDS.  BLOOD SUGAR 168;
COVERAGE GIVEN.  NO OTHER ACUTE CHANGES TO NOTE ON AT THIS TIME.  WILL
CONTINUE TO MONITOR.

## 2023-03-14 LAB
ANION GAP SERPL CALCULATED.4IONS-SCNC: 11 MMOL/L (ref 6–16)
BUN SERPL-MCNC: 66 MG/DL (ref 8–24)
CALCIUM SERPL-MCNC: 6.2 MG/DL (ref 8.5–10.1)
CHLORIDE SERPL-SCNC: 93 MMOL/L (ref 98–108)
CO2 SERPL-SCNC: 19 MMOL/L (ref 21–32)
CREAT SERPL-MCNC: 2.96 MG/DL (ref 0.6–1.2)
DEPRECATED RDW RBC AUTO: 42.6 FL (ref 35.1–46.3)
ERYTHROCYTE [DISTWIDTH] IN BLOOD BY AUTOMATED COUNT: 12.4 % (ref 11.7–14.2)
GLUCOSE SERPL-MCNC: 253 MG/DL (ref 70–99)
HCT VFR BLD AUTO: 31.6 % (ref 37–53)
HGB BLD-MCNC: 11.1 G/DL (ref 13.5–17.5)
MAGNESIUM SERPL-MCNC: 1.9 MG/DL (ref 1.6–2.4)
MCHC RBC AUTO-ENTMCNC: 35.1 G/DL (ref 31.5–36.5)
MCV RBC AUTO: 92 FL (ref 80–100)
NRBC # BLD AUTO: 0.04 K/MM3 (ref 0–0.02)
NRBC BLD AUTO-RTO: 0.1 /100 WBC (ref 0–0.2)
PHOSPHATE SERPL-MCNC: 6.5 MG/DL (ref 2.5–4.9)
PLATELET # BLD AUTO: 64 K/MM3 (ref 150–400)
POTASSIUM SERPL-SCNC: 4.8 MMOL/L (ref 3.5–5.5)
SODIUM SERPL-SCNC: 123 MMOL/L (ref 136–145)

## 2023-03-14 NOTE — NUR
DR. HARVEY UPDATED ON PATIENT STATUS.  INFORMED THAT WBCS INCREASING, SODIUM
DECREASING, PLATELETS DECREASING AND KIDNEY LABS WORSENING.  INFORMED THAT
PATIENT  MLS OF URINE OUT ON NIGHT SHIFT.  INFORMED THAT PATIENT PLACED
ON BIPAP DURING NIGHT.  INFORMED THAT LEVOPHED HAS BEEN TITRATED DOWN FROM 30
TO 22 MCG/ MINUTE.  INFORMED THAT FINGERS AND TOES MORE CYANOTIC TODAY THAN
YESTERDAY.  NO ORDERS RECEIVED AT THIS TIME.  WILL CONTINUE TO MONITOR.

## 2023-03-14 NOTE — NUR
Spiritual Care | EOL
Pt. has passed and Spouse and son are present and welcome my visit. Confirm
that [facility]  home in [place] as the family's  home choice.
Prayed a blessing over the Pt. and family. Spouse and son verbalize gratitude
for the spiritual care visit.

## 2023-03-14 NOTE — NUR
TIME OF DEATH 1340.  PATIENT REMAINED APPEARING COMFORTABLE AFTER MAKING
COMFORT CARE.  PATIENT'S WIFE AND SON REMAINED AT BEDSIDE.  NO BELONGINGS IN
PATIENT ROOM TO SEND HOME WITH FAMILY.

## 2023-03-14 NOTE — NUR
PATIENT'S WIFE AND SON ARRIVED HERE FROM [place].  PRIMARY NURSE, CHARGE
NURSE AND DR. HARVEY SPOKE WITH FAMILY AND GAVE THEM AN UPDATE ON PATIENT
STATUS/ CONDITION.  WIFE AND SON WENT TO ROOM TO SEE PATIENT.  WIFE DECIDED
THAT SHE WANTED TO MAKE PATIENT COMFORT CARE AND MAKE HIM COMFORTABLE.  DR. HARVEY INFORMED.  PALLIATIVE CARE NURSE ALSO SPOKE WITH FAMILY.  PRESSORS TURNED
OFF AND GIVEN PRN PAIN MEDICATION PER DR. HARVEY.  WILL CONTINUE TO MONITOR.

## 2023-03-14 NOTE — NUR
PT'S MOM AND BROTHER ARE AT BEDSIDE. THEY HAVE ELECTED COMFORT CARE FOR
PT AFTER DISCUSSION WITH DR. HARVEY AND BEDSIDE RN. COMFORT ORDERS PLACED.
 
BIPAP REMOVED, PRESSERS TURNED OFF. PT'S MOM REMAINS AT BEDSIDE, SAYING
GOODBYE. PT'S RESP ARE UNEVEN, AGONAL.
 
PALLIATIVE CARE TO REMAIN AVAILABLE.

## 2023-03-14 NOTE — NUR
SUMMARY
PATIENT RESTING WITH BIPAP 16/8 FIO2 40% IN PLACE RESP CONTINUE 30-40.  OPENS
EYES, MOVES ARMS BUT NOT ALWAYS TO DIRECTIONS. HYPOTENSION CONTINUES EPI 20
MCG, LEVOPHED 26 MCG AND VASOPRESSIN 0.04 MCG. HEART RATE 70'S AT TIMES WITH
WIDENING QRS. TIPS OF FINGERS AND TOES DUSKY EXTREMITIES COOL. AT TIMES
DIAPHORETIC. DOBHOFF REMAINS IN PLACE TO RIGHT NARE FOR MEDICATIONS CLAMPED
AT THIS TIME. PATIENTS WIFE SAVAGE GIVEN UPDATE THIS MORNING, SHE
VERBALIZED THAT SHE WILL BE HERE APROX 1100 WITH HER SON BILL.

## 2023-03-14 NOTE — NUR
INITIAL ASSESSMENT
  PATIENT SLEEPING IN BED UPON ENTERING ROOM.  PATIENT KEEPS L EYE OPEN.  EYE
APPEARS MOISTENED WELL BY LAST LACRILUBE ADMINISTRATION.  L PUPIL NONREACTIVE
AND CLOUDY.  R PUPIL APPEARS SLUGGLISHLY REACTIVE TO LIGHT.  PATIENT MUMBLES
AND MOANS OCCASIONALLY.  PATIENT DOES NOT RESPOND TO NURSE.  PATIENT TRIES TO
CLOSE MOUTH TO ORAL CARE.  PATIENT MOVES BILAT ARMS SLIGHTLY AND OCCASIONALLY
BUT DOES NOT APPEAR TO BE PURPOSEFUL MOVEMENT.  PATIENT CONTINUES TO STARE
STRAIGHT AHEAD AND DOES NOT TRACK NURSE.  PATIENT AFEBRILE.  NO SIGNS OF PAIN
NOTED AT THIS TIME.  GRUNTING WITH BREATHING LESS TODAY.  PATIENT ON BIPAP AT
16/8, RATE OF 16 AND 30% FIO2.  UPPER LUNG LOBES CLEAR, LOWER LOBES DIM WITH
CRACKLES.  RR 30S TO 40S.  PATIENT IN AV DISSOCIATION, HR IN THE 70S.  PATIENT
ON LEVOPHED AT 24 MCG/ MINUTE, VASOPRESSIN AT 0.04 UNITS/ MINUTE AND
EPINEPHRINE AT 20 MCG/ MINUTE.  1+ EDEMA NOTED TO BLES.  EDEMA NOTED TO PENIS
AND SCROTUM.  SCDS IN PLACE.  TOES AND FINGERS CYANOTIC.  ABD MODERATELY
DISTENDED, SOFT, WITH HYPERACTIVE BOWEL SOUNDS NOTED.  DOBHOFF IN PLACE TO R
NARE AT 60 CM; CLAMPED.  ALEXIS IN PLACE DRAINING MINIMAL AMOUNT OF BOAZ
COLORED URINE WITH SEDIMENT NOTED.  SKIN PALE, COOL.  SCATTERED BRUISING
NOTED.  COCCYX REDDENED.  NS TKO.  SODIUM BICARB INFUSING AT 75 MLS/ HOUR.
BED LOW, CALL LIGHT IN REACH.  WILL CONTINUE TO MONITOR PATIENT FREQUENTLY
THROUGHOUT SHIFT.

## 2023-03-14 NOTE — NUR
PATIENT HAD COMPLETE BED BATH.  PATIENT DESATTED DOWN TO 60S VERY QUICKLY WHEN
LAYED FLAT FOR BED BATH.  FIO2 INCREASED % ON BIPAP AND PATIENT HOB
ELEVATED.  PATIENT TOOK ABOUT A MINUTE TO COME BACK UP TO 90S.  BIPAP
DECREASED BACK DOWN TO 30% AFTER BED BATH COMPLETE AND REMAINS SATTING IN 90S.
 DR. HARVEY INFORMED.  WILL CONTINUE TO MONITOR.
